# Patient Record
(demographics unavailable — no encounter records)

---

## 2024-10-10 NOTE — HISTORY OF PRESENT ILLNESS
[de-identified] : 86yo M returns for a routine ear cleaning. His L ear has been itching terribly causing him to insert his finger. Known bl mild-mod SNHL, he now has OTC HAs which work ok.  No dizziness right now but has history of trouble balancing that began suddenly in the 80s; a workup showed a vestibular problem and nothing has changed since then.

## 2024-10-10 NOTE — PHYSICAL EXAM
[Binocular Microscopic Exam] : Binocular microscopic exam was performed [Normal] : the left middle ear was normal [FreeTextEntry8] : Obstructing cerumen was removed with a hook & suction [FreeTextEntry9] : Obstructing cerumen was removed with a hook & suction

## 2024-10-10 NOTE — ASSESSMENT
[FreeTextEntry1] : RTC for a   VNG/vestibular workup declined; We discussed fall prevention and avoiding driving/operating machinery, staying away from subway platform edges, etc. no

## 2024-10-10 NOTE — DATA REVIEWED
[de-identified] : 7/15/21: bl As type tymp, mild to mod SNHL bl. % AD, 90% AS 10/3/22: bl As type tymp, mild to mod SNHL bl, c/w previous Today: audio testing was deferred to a scheduled appointment. VNG declined.

## 2024-10-31 NOTE — HISTORY OF PRESENT ILLNESS
[Post-hospitalization from ___ Hospital] : Post-hospitalization from [unfilled] Hospital [Admitted on: ___] : The patient was admitted on [unfilled] [Discharged on ___] : discharged on [unfilled] [Discharge Summary] : discharge summary [Pertinent Labs] : pertinent labs [Radiology Findings] : radiology findings [Discharge Med List] : discharge medication list [Med Reconciliation] : medication reconciliation has been completed [Patient Contacted By: ____] : and contacted by [unfilled] [Home] : at home, [unfilled] , at the time of the visit. [Medical Office: ([facility])___] : at the medical office located in  [Verbal consent obtained from patient] : the patient, [unfilled] [Formal Caregiver] : formal caregiver [FreeTextEntry2] : Approximately 6 days ago patient developed upper respiratory symptoms with instructed to go to urgent care where he tested positive for COVID was given medication and sent home.  Over the course of that evening patient became progressively weak called 911 and was taken to the closest emergency room which was around the corner from the patient's home.  Patient been 2 and half days in the hospital.  Per patient received remdesivir and discharged with Paxlovid.  Patient currently on day 3 of Paxlovid.  Tolerating well.  Patient is upper respiratory symptoms have abated however patient still very fatigued.  Patient has been not taking his statin nor his aspirin.  Patient has lived in caretaker.  Of note patient stated that he had elevated blood pressure during hospitalization which was part under control.  Patient discharged on previous blood pressure medicines with controlled blood pressure.

## 2024-10-31 NOTE — REVIEW OF SYSTEMS
[Fatigue] : fatigue [Negative] : Heme/Lymph [Fever] : no fever [Chills] : no chills [Night Sweats] : no night sweats [Recent Change In Weight] : ~T no recent weight change [Shortness Of Breath] : no shortness of breath [Wheezing] : no wheezing [Cough] : no cough

## 2025-06-03 NOTE — HISTORY OF PRESENT ILLNESS
[de-identified] : Returns with worsened hearing after being in the shower; he suspects that this is an impaction. His ear itching is well controlled on fluocinolone prn. Known bl mild-mod SNHL, He has OTC HAs which work ok.  No dizziness right now but has history of trouble balancing that began suddenly in the 80s; a workup showed a vestibular problem and nothing has changed since then with ongoing difficulty with balance while walking. Long ago he got significant improvement with vestibular rehab.

## 2025-06-03 NOTE — DATA REVIEWED
[de-identified] : 7/15/21: bl As type tymp, mild to mod SNHL bl. % AD, 90% AS 10/3/22: bl As type tymp, mild to mod SNHL bl, c/w previous 12/24: stable hearing

## 2025-06-03 NOTE — ASSESSMENT
[FreeTextEntry1] : Improved hearing after the cleaning.   We discussed another course of vestibular rehab and he'd like to proceed. RTC for an ear cleaning in 6 months; annual audios, sooner prn any changes.

## 2025-06-03 NOTE — PHYSICAL EXAM
[Binocular Microscopic Exam] : Binocular microscopic exam was performed [Normal] : the left middle ear was normal [FreeTextEntry8] : completely obstructing cerumen removed with suction [FreeTextEntry9] : completely obstructing cerumen removed with suction

## 2025-06-26 NOTE — REVIEW OF SYSTEMS
[Fever] : no fever [Chills] : no chills [Fatigue] : fatigue [Night Sweats] : no night sweats [Recent Change In Weight] : ~T no recent weight change [Shortness Of Breath] : no shortness of breath [Wheezing] : no wheezing [Cough] : no cough [Negative] : Heme/Lymph

## 2025-06-26 NOTE — HISTORY OF PRESENT ILLNESS
[FreeTextEntry8] : Patient presents today complaining about additional episode of nocturia over the last 2 nights.  Patient traditionally wakes up 1 time at night at 3:30 AM after going to bed typically at 11:30 PM and empties his bladder.  Last 2 nights patient unable to fall back to sleep and gets up again about 4:30 AM to urinate again.  Patient states that he has no difficulty with stream and is emptying his bladder fully as per his perception.  On further questioning patient has been having worsening sleep quality.  Describes an itching sensation in the almost in the process of like quality to wake up and do something.  Patient shows no overt signs of cognitive decline at 90 years old.  Continues to live independently.

## 2025-07-17 NOTE — ASSESSMENT
[FreeTextEntry1] : Performed as part of preventive exam:     See tests ordered below.     Counseling: Safe sex, sunscreen, seatbelts, helmets.     Routine ophthalmology and dental exams encouraged.     Age appropriate cardiovascular exercise discussed.     Monthly gender specific exams discussed.     Annual skin screens recommended.     Self-authored health maintenance handout given.  The patient was seen today for a Medicare subsequent annual wellness visit. See above for the full details of the history and physical.   Cognitive function is intact. I spent 5 minutes preforming depression screening on this patient. The patient's care team was reviewed and documented above. If there are no providers listed I remain the patient's primary provider.  Also listed above, only if deemed necessary, are the preventive examinations for which the patient may be due.  If applicable, I provided the patient with a list of these recommended procedures and offered assistance in scheduling the appropriate exams. In addition, we discussed advance directives, falls prevention and driving precautions.  Tobacco screening preformed. Firearm screening preformed. Reviewed age appropriate vaccinations with patient.      [Vaccines Reviewed] : Immunizations reviewed today. Please see immunization details in the vaccine log within the immunization flowsheet.

## 2025-07-17 NOTE — HISTORY OF PRESENT ILLNESS
[de-identified] : Patient feels well. No specific complaints except that patient's chronically large abdomen has increased in size over the last week. Patients he has gained weight. Denies SOB, LE swelling, or pain.  All active problems, past medical history, and surgical history, family history, social history, medications and allergies reviewed and reconciled.

## 2025-07-17 NOTE — HEALTH RISK ASSESSMENT
[Very Good] : ~his/her~  mood as very good [No] : In the past 12 months have you used drugs other than those required for medical reasons? No [No falls in past year] : Patient reported no falls in the past year [0] : 2) Feeling down, depressed, or hopeless: Not at all (0) [PHQ-2 Negative - No further assessment needed] : PHQ-2 Negative - No further assessment needed [Former] : Former [20 or more] : 20 or more [> 15 Years] : > 15 Years [YES] : Yes [Are there any unlocked firearms stored in your household?] : There are unlocked firearms in the household. [Have you attended a firearm safety workshop or class?] : A firearm safety workshop or class has been attended. [Patient reported bone density results were normal] : Patient reported bone density results were normal [Patient declined colonoscopy] : Patient declined colonoscopy [Fully functional (bathing, dressing, toileting, transferring, walking, feeding)] : Fully functional (bathing, dressing, toileting, transferring, walking, feeding) [Fully functional (using the telephone, shopping, preparing meals, housekeeping, doing laundry, using] : Fully functional and needs no help or supervision to perform IADLs (using the telephone, shopping, preparing meals, housekeeping, doing laundry, using transportation, managing medications and managing finances) [With Patient/Caregiver] : , with patient/caregiver [Reviewed no changes] : Reviewed, no changes [Fair] :  ~his/her~ mood as fair [Yes] : Reviewed medication list for presence of high-risk medications. [Opioids] : opioids [[facility]] : 10 [[id number]] : 0 [Are there any firearms stored in your household that are loaded?] : No firearms are stored in the household loaded. [Are there any children in your household?] : No children are in the household. [Has anyone in the household been feeling low/depressed/been struggling?] : No one in the household has been feeling low/depressed/been struggling. [BoneDensityDate] : 07/13 [ColonoscopyDate] : 07/04 [AdvancecareDate] : 07/25

## 2025-07-17 NOTE — PHYSICAL EXAM
[No Acute Distress] : no acute distress [Well Nourished] : well nourished [Well Developed] : well developed [Well-Appearing] : well-appearing [Normal Sclera/Conjunctiva] : normal sclera/conjunctiva [PERRL] : pupils equal round and reactive to light [EOMI] : extraocular movements intact [Normal Outer Ear/Nose] : the outer ears and nose were normal in appearance [Normal Oropharynx] : the oropharynx was normal [No JVD] : no jugular venous distention [No Lymphadenopathy] : no lymphadenopathy [Supple] : supple [Thyroid Normal, No Nodules] : the thyroid was normal and there were no nodules present [No Respiratory Distress] : no respiratory distress  [No Accessory Muscle Use] : no accessory muscle use [Clear to Auscultation] : lungs were clear to auscultation bilaterally [Normal Rate] : normal rate  [Regular Rhythm] : with a regular rhythm [Normal S1, S2] : normal S1 and S2 [No Murmur] : no murmur heard [No Carotid Bruits] : no carotid bruits [No Abdominal Bruit] : a ~M bruit was not heard ~T in the abdomen [No Varicosities] : no varicosities [Pedal Pulses Present] : the pedal pulses are present [No Edema] : there was no peripheral edema [No Palpable Aorta] : no palpable aorta [No Extremity Clubbing/Cyanosis] : no extremity clubbing/cyanosis [Soft] : abdomen soft [Non Tender] : non-tender [Non-distended] : non-distended [No Masses] : no abdominal mass palpated [No HSM] : no HSM [Normal Bowel Sounds] : normal bowel sounds [No CVA Tenderness] : no CVA  tenderness [No Spinal Tenderness] : no spinal tenderness [No Joint Swelling] : no joint swelling [Grossly Normal Strength/Tone] : grossly normal strength/tone [No Rash] : no rash [Coordination Grossly Intact] : coordination grossly intact [No Focal Deficits] : no focal deficits [Normal Gait] : normal gait [Deep Tendon Reflexes (DTR)] : deep tendon reflexes were 2+ and symmetric [Normal Affect] : the affect was normal [Normal Insight/Judgement] : insight and judgment were intact [Rounded] : rounded [Normal] : normal [Tympanitic] : tympanitic to percussion [Firm] : firm [No Mass] : no masses were palpated [None] : no CVA tenderness [Dilated Collat. Veins] : no collateral vein dilation [Striae] : no striae [Abdomen Hernia Incisional] : no incisional hernia [Epigastric] : not in the epigastric area [Periumbilical] : not periumbilical [Suprapubic] : not in the suprapubic area [RUQ] : not in the RUQ [RLQ] : not in the RLQ [LUQ] : not in the LUQ [LLQ] : not in the LLQ [Rigid] : not rigid [Rebound] : no rebound [Guarding] : no guarding

## 2025-07-17 NOTE — REVIEW OF SYSTEMS
[Negative] : Heme/Lymph [Abdominal Pain] : no abdominal pain [Nausea] : no nausea [Constipation] : no constipation [Diarrhea] : no diarrhea [Vomiting] : no vomiting [Heartburn] : no heartburn [Melena] : no melena